# Patient Record
Sex: FEMALE | Race: WHITE | NOT HISPANIC OR LATINO | Employment: FULL TIME | URBAN - METROPOLITAN AREA
[De-identification: names, ages, dates, MRNs, and addresses within clinical notes are randomized per-mention and may not be internally consistent; named-entity substitution may affect disease eponyms.]

---

## 2020-03-26 DIAGNOSIS — R50.9 FEVER, UNSPECIFIED: Primary | ICD-10-CM

## 2020-03-30 ENCOUNTER — TELEPHONE (OUTPATIENT)
Dept: INFECTIOUS DISEASES | Facility: CLINIC | Age: 40
End: 2020-03-30

## 2020-03-31 ENCOUNTER — TELEPHONE (OUTPATIENT)
Dept: INFECTIOUS DISEASES | Facility: CLINIC | Age: 40
End: 2020-03-31

## 2020-03-31 PROBLEM — G43.909 MIGRAINE HEADACHE: Status: ACTIVE | Noted: 2019-08-02

## 2020-03-31 NOTE — TELEPHONE ENCOUNTER
3/31/20 results called to employee at this time  Your test was NEGATIVE for COVID-19 (coronavirus).  If you still have symptoms, treat with rest, fluids, and over-the-counter medications.  Continue to stay home, avoid large crowds, and practice proper handwashing.     If your symptoms worsen or if you have any other concerns, please contact [facility] at [phone].     Sincerely,    Paul Daugherty NP

## 2020-04-05 PROBLEM — I10 ESSENTIAL HYPERTENSION: Status: ACTIVE | Noted: 2020-04-05

## 2020-04-05 PROBLEM — R05.3 PERSISTENT COUGH: Status: ACTIVE | Noted: 2020-04-05

## 2020-04-05 PROBLEM — R05.3 PERSISTENT COUGH: Status: RESOLVED | Noted: 2020-04-05 | Resolved: 2020-04-05

## 2020-04-21 DIAGNOSIS — Z01.84 ANTIBODY RESPONSE EXAMINATION: ICD-10-CM

## 2020-06-19 PROBLEM — S43.102A SEPARATION OF LEFT ACROMIOCLAVICULAR JOINT: Status: ACTIVE | Noted: 2020-06-19

## 2020-06-19 PROBLEM — M25.512 ACUTE PAIN OF LEFT SHOULDER: Status: ACTIVE | Noted: 2020-06-19
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
## 2020-06-19 PROBLEM — M25.552 LEFT HIP PAIN: Status: ACTIVE | Noted: 2020-06-19

## 2020-11-17 DIAGNOSIS — R19.7 DIARRHEA, UNSPECIFIED TYPE: ICD-10-CM

## 2020-11-17 DIAGNOSIS — R09.81 SINUS CONGESTION: ICD-10-CM

## 2020-11-17 DIAGNOSIS — R05.9 COUGH: Primary | ICD-10-CM

## 2020-11-18 ENCOUNTER — TELEPHONE (OUTPATIENT)
Dept: PRIMARY CARE CLINIC | Facility: CLINIC | Age: 40
End: 2020-11-18

## 2022-06-23 ENCOUNTER — TELEPHONE (OUTPATIENT)
Dept: ADMINISTRATIVE | Facility: HOSPITAL | Age: 42
End: 2022-06-23

## 2022-08-02 ENCOUNTER — TELEPHONE (OUTPATIENT)
Dept: ADMINISTRATIVE | Facility: HOSPITAL | Age: 42
End: 2022-08-02

## 2022-08-25 ENCOUNTER — TELEPHONE (OUTPATIENT)
Dept: ADMINISTRATIVE | Facility: HOSPITAL | Age: 42
End: 2022-08-25